# Patient Record
(demographics unavailable — no encounter records)

---

## 2025-04-22 NOTE — ASSESSMENT
[FreeTextEntry1] : 1. Hashimoto's Thyroiditis - Ms. Gutierrez has a history of Hashimoto's thyroiditis diagnosed in 2019, with positive antibodies but normal thyroid function at that time. - Intermittently elevated TSH levels reported by her primary care provider suggest possible hypothyroidism. - Her current symptoms of palpitations, chest sensations, and anxiety are not typical of hypothyroidism. - Comprehensive thyroid function tests (TSH, Free T4, Total T3) ordered today. - Thyroid ultrasound ordered. - Follow-up in three months. - If thyroid function tests are normal, recommend cardiology evaluation.  RTC in 3 months

## 2025-04-22 NOTE — PHYSICAL EXAM
[Alert] : alert [Well Nourished] : well nourished [No Acute Distress] : no acute distress [Well Developed] : well developed [Normal Sclera/Conjunctiva] : normal sclera/conjunctiva [No Proptosis] : no proptosis [Thyroid Not Enlarged] : the thyroid was not enlarged [No Respiratory Distress] : no respiratory distress [No Accessory Muscle Use] : no accessory muscle use [Clear to Auscultation] : lungs were clear to auscultation bilaterally [Normal S1, S2] : normal S1 and S2 [Normal Rate] : heart rate was normal [Regular Rhythm] : with a regular rhythm [No Edema] : no peripheral edema [Not Tender] : non-tender [Soft] : abdomen soft [Oriented x3] : oriented to person, place, and time

## 2025-04-22 NOTE — REVIEW OF SYSTEMS
[Fatigue] : no fatigue [Recent Weight Gain (___ Lbs)] : recent weight gain: [unfilled] lbs [Fever] : no fever [Chest Pain] : no chest pain [Palpitations] : palpitations [Shortness Of Breath] : no shortness of breath [Nausea] : no nausea [Constipation] : no constipation [Vomiting] : no vomiting [Diarrhea] : no diarrhea [Irregular Menses] : regular menses [Anxiety] : anxiety [Stress] : stress

## 2025-04-22 NOTE — HISTORY OF PRESENT ILLNESS
[FreeTextEntry1] : Ms. Mariah Gutierrez is a 40-year-old woman with Hashimoto's thyroiditis who presents to the clinic to establish care.   She presents with recurrent episodes of chest sensations, palpitations, and increased anxiety over the past couple of weeks.  These symptoms have been occurring on and off since 2019 but have worsened recently. She describes the chest sensation as a "wave" starting in her chest and sometimes radiating to her left arm, occasionally accompanied by dizziness. She rates the sensation as 7/10 in intensity, but clarifies it is not painful. The palpitations are characterized by a racing heart and strong heartbeats, sometimes occurring with the chest sensations and sometimes independently.  She was previously evaluated in 2019 for similar symptoms.  At that time, she initially saw a cardiologist who recommended a thyroid evaluation based on heterogeneous echotexture seen on an in-office thyroid ultrasound. She was subsequently diagnosed with Hashimoto's thyroiditis by an endocrinologist based on positive thyroid antibodies; however, her thyroid function tests were normal. Her primary care provider has been monitoring her TSH levels, which have been intermittently elevated.  She reports a 6-pound weight gain over the past year, but denies fatigue, cold intolerance, constipation, or menstrual irregularities.  Her menses are regular.  She takes a women's multivitamin, but denies other medications, including biotin. She has a family history of thyroid disorders in an aunt, but the specific diagnosis is unknown.  She denies a family history of autoimmune conditions.   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 She quit smoking 20 years ago after smoking three cigarettes a day for three years. She consumes an average of seven alcoholic drinks per week and denies recreational drug use. She has no history of surgeries.  She has had one child since her last endocrinology visit in 2019 and is not currently trying to conceive.  She denies any history of radiation exposure and has never taken amiodarone or lithium. Denies having any recent viral infection or iodinated contrast study.